# Patient Record
Sex: MALE | Race: WHITE | NOT HISPANIC OR LATINO | Employment: FULL TIME | URBAN - METROPOLITAN AREA
[De-identification: names, ages, dates, MRNs, and addresses within clinical notes are randomized per-mention and may not be internally consistent; named-entity substitution may affect disease eponyms.]

---

## 2017-05-23 ENCOUNTER — OFFICE VISIT (OUTPATIENT)
Dept: RETAIL CLINIC | Facility: CLINIC | Age: 42
End: 2017-05-23

## 2017-05-23 DIAGNOSIS — Z02.83 ENCOUNTER FOR DRUG SCREENING: Primary | ICD-10-CM

## 2017-07-24 ENCOUNTER — OFFICE VISIT (OUTPATIENT)
Dept: INTERNAL MEDICINE | Facility: CLINIC | Age: 42
End: 2017-07-24

## 2017-07-24 VITALS
HEIGHT: 71 IN | WEIGHT: 203.8 LBS | DIASTOLIC BLOOD PRESSURE: 74 MMHG | SYSTOLIC BLOOD PRESSURE: 122 MMHG | BODY MASS INDEX: 28.53 KG/M2 | TEMPERATURE: 97.8 F

## 2017-07-24 DIAGNOSIS — E78.00 PURE HYPERCHOLESTEROLEMIA: ICD-10-CM

## 2017-07-24 DIAGNOSIS — K21.9 GASTROESOPHAGEAL REFLUX DISEASE, ESOPHAGITIS PRESENCE NOT SPECIFIED: Primary | ICD-10-CM

## 2017-07-24 DIAGNOSIS — B37.2 SKIN YEAST INFECTION: ICD-10-CM

## 2017-07-24 PROCEDURE — 99214 OFFICE O/P EST MOD 30 MIN: CPT | Performed by: FAMILY MEDICINE

## 2017-07-24 RX ORDER — CLOTRIMAZOLE 1 %
CREAM (GRAM) TOPICAL 2 TIMES DAILY
Qty: 14 G | Refills: 0 | OUTPATIENT
Start: 2017-07-24 | End: 2019-08-04

## 2017-07-24 RX ORDER — OMEPRAZOLE 40 MG/1
CAPSULE, DELAYED RELEASE ORAL
Qty: 30 CAPSULE | Refills: 5 | OUTPATIENT
Start: 2017-07-24 | End: 2019-08-04

## 2017-07-24 NOTE — PROGRESS NOTES
Subjective   Devyn Shelton is a 41 y.o. male.     History of Present Illness   Here for 6mo routine. Last seen 1/7/16 for CPE and routine recheck GERD. Pt seen 3/2/15 as a new patient. Had normal CBC and + H pylori. Labs 1/7/16 demo normal CBC and CMP. Had vit D 21.9. Had borderline lipid with , Tg 173, HDL 35, .    1.GI- GERD. Pt is post treatment for H pylori. Did well but continued to have occasional reflux. Was also having some constipation with straining and hemorrhoids. Was started on miralax and given anusol. Was advised to keep the omeprazole prn (ie- 3x/wk) but to reduce it if able once his constipation improved. Today he reports he still has rectal itching. Has a BM q1-2 days. No straining. Is having more reflux. T^akes TUMS qd but still having reflux during the night. Doesn't have any omeprazole.      2.CV- borderline hyperlipid. Noted on screening labs. Pt advised diet with recheck lab today. Today he reports he has not been working on his and is not fasting today. 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    The following portions of the patient's history were reviewed and updated as appropriate: current medications, past family history, past medical history, past social history, past surgical history and problem list.    Review of Systems   Cardiovascular: Negative for chest pain.   Gastrointestinal: Negative for abdominal distention and abdominal pain.        Heartburn   Skin: Negative for color change.   Neurological: Negative for tremors, speech difficulty and headaches.   Psychiatric/Behavioral: Negative for agitation and confusion.   All other systems reviewed and are negative.        Current Outpatient Prescriptions:   •  clotrimazole (LOTRIMIN AF) 1 % cream, Apply  topically 2 (Two) Times a Day. Apply to itchy skin bid until improved, Disp: 14 g, Rfl: 0  •  omeprazole (priLOSEC) 40 MG capsule, for stomach acid or reflux, Disp: 30 capsule, Rfl: 5  •   "polyethylene glycol (MIRALAX) powder, Take  by mouth daily. Mix 1 capful in 8 ounces of liquid and drink daily to maintain soft, regular stools., Disp: , Rfl:     Objective     /74  Temp 97.8 °F (36.6 °C)  Ht 71\" (180.3 cm)  Wt 203 lb 12.8 oz (92.4 kg)  BMI 28.42 kg/m2    Physical Exam   Constitutional: He is oriented to person, place, and time. He appears well-developed and well-nourished.   HENT:   Right Ear: Tympanic membrane and ear canal normal.   Left Ear: Tympanic membrane and ear canal normal.   Mouth/Throat: Oropharynx is clear and moist.   Eyes: Conjunctivae and EOM are normal. Pupils are equal, round, and reactive to light.   Neck: No thyromegaly present.   Cardiovascular: Normal rate and regular rhythm.    Pulmonary/Chest: Effort normal and breath sounds normal.   Genitourinary: Rectum normal.   Neurological: He is alert and oriented to person, place, and time.   Skin: Skin is warm and dry.   Psychiatric: He has a normal mood and affect.   Vitals reviewed.      Assessment/Plan   Devyn was seen today for heartburn.    Diagnoses and all orders for this visit:    Gastroesophageal reflux disease, esophagitis presence not specified  -     omeprazole (priLOSEC) 40 MG capsule; for stomach acid or reflux    Pure hypercholesterolemia    Skin yeast infection  -     clotrimazole (LOTRIMIN AF) 1 % cream; Apply  topically 2 (Two) Times a Day. Apply to itchy skin bid until improved      1. GI- GERD- pt will restart the omeprazole every day now. Is to take it for a full month and then try going to \"as needed\". If still needing it 2x/wk or more, to restart it every day. Can try going to   \"as needed\" at the end of each month. Edu printout. Will monitor his calcium and B12.  2. DERM- yeast. Discussed that he is having minor yeast issues around his rectum. This can relate to sweating or to the steroid in the hemorrhoid cream. Will treat with lotrimin cream as needed.  2. CV- borderline hyperlipid- pt will " start to work on diet now and will fast for recheck in 6mo  3. RECHECK- CPE with fasting labs

## 2017-07-24 NOTE — PATIENT INSTRUCTIONS
"1. GI- GERD- pt will restart the omeprazole every day now. Is to take it for a full month and then try going to \"as needed\". If still needing it 2x/wk or more, to restart it every day. Can try going to   \"as needed\" at the end of each month. Edu printout. Will monitor his calcium and B12.  2. DERM- yeast. Discussed that he is having minor yeast issues around his rectum. This can relate to sweating or to the steroid in the hemorrhoid cream. Will treat with lotrimin cream as needed.  2. CV- borderline hyperlipid- pt will start to work on diet now and will fast for recheck in 6mo  3. RECHECK- CPE with fasting labs    Patient education provided today in layman's terms re GERD (acid reflux) and gastritis. The patient is shown a diagram of the gastrointestinal tract with a brief description of the digestive process. The role of stomach acid (to kill germs and digest food) and stomach mucus (to protect the stomach against its own acid) is discussed. The need for a balance between these and the problems that result when this balance is disrupted are discussed.     The patient is advised that things that promote too much stomach acid include:  - NSAIDs (anti inflammatories such as aspirin, ibuprofen, naproxen and certain prescriptions)  - high acid foods (tomato products, onions, citrus, etc)  - caffeine  - tobacco  - alcohol  - stress.   It is discussed that long term or high exposure to these can lead to an acid imbalance resulting in microscopic burns in the stomach or esophagus. Furthermore, that with repeated burns, a visible wound called an ulcer can form.    The treatment options are discussed, including avoiding the causes if possible. The first level of treatment can include antacids which neutralize the acid after the fact, such as TUMS, rolaids or gaviscon. The second level of treatment includes H2 blockers which reduce the production of acid in the stomach and include medicines such as pepcid (famotadine) and " zantac (ranitadine). The third level of treatment includes PPI's which turn the stomach acid off and include meducines such as prilosec (omeprazole), prevacid (lansoprazole) or protonix (pantoprazole). Caution is advised regarding the long term use of PPI's due to the possible side effect of malabsorption of key nutrients such as B12 or calcium if the acid level is driven too low for too long.

## 2019-08-06 ENCOUNTER — OFFICE VISIT (OUTPATIENT)
Dept: FAMILY MEDICINE CLINIC | Facility: CLINIC | Age: 44
End: 2019-08-06

## 2019-08-06 ENCOUNTER — APPOINTMENT (OUTPATIENT)
Dept: LAB | Facility: HOSPITAL | Age: 44
End: 2019-08-06

## 2019-08-06 VITALS
BODY MASS INDEX: 28.61 KG/M2 | HEART RATE: 78 BPM | HEIGHT: 71 IN | SYSTOLIC BLOOD PRESSURE: 142 MMHG | WEIGHT: 204.4 LBS | DIASTOLIC BLOOD PRESSURE: 90 MMHG | OXYGEN SATURATION: 98 %

## 2019-08-06 DIAGNOSIS — E06.9 THYROIDITIS: Primary | ICD-10-CM

## 2019-08-06 DIAGNOSIS — I88.9 LYMPHADENITIS: ICD-10-CM

## 2019-08-06 DIAGNOSIS — E78.00 PURE HYPERCHOLESTEROLEMIA: ICD-10-CM

## 2019-08-06 LAB
ALBUMIN SERPL-MCNC: 4.2 G/DL (ref 3.5–5.2)
ALBUMIN/GLOB SERPL: 1.7 G/DL
ALP SERPL-CCNC: 93 U/L (ref 39–117)
ALT SERPL W P-5'-P-CCNC: 29 U/L (ref 1–41)
ANION GAP SERPL CALCULATED.3IONS-SCNC: 11.5 MMOL/L (ref 5–15)
AST SERPL-CCNC: 22 U/L (ref 1–40)
BASOPHILS # BLD AUTO: 0.03 10*3/MM3 (ref 0–0.2)
BASOPHILS NFR BLD AUTO: 0.6 % (ref 0–1.5)
BILIRUB SERPL-MCNC: 0.6 MG/DL (ref 0.2–1.2)
BUN BLD-MCNC: 10 MG/DL (ref 6–20)
BUN/CREAT SERPL: 10.3 (ref 7–25)
CALCIUM SPEC-SCNC: 9.2 MG/DL (ref 8.6–10.5)
CHLORIDE SERPL-SCNC: 107 MMOL/L (ref 98–107)
CHOLEST SERPL-MCNC: 187 MG/DL (ref 0–200)
CO2 SERPL-SCNC: 26.5 MMOL/L (ref 22–29)
CREAT BLD-MCNC: 0.97 MG/DL (ref 0.76–1.27)
DEPRECATED RDW RBC AUTO: 39.3 FL (ref 37–54)
EOSINOPHIL # BLD AUTO: 0.3 10*3/MM3 (ref 0–0.4)
EOSINOPHIL NFR BLD AUTO: 5.5 % (ref 0.3–6.2)
ERYTHROCYTE [DISTWIDTH] IN BLOOD BY AUTOMATED COUNT: 12.2 % (ref 12.3–15.4)
GFR SERPL CREATININE-BSD FRML MDRD: 84 ML/MIN/1.73
GLOBULIN UR ELPH-MCNC: 2.5 GM/DL
GLUCOSE BLD-MCNC: 90 MG/DL (ref 65–99)
HCT VFR BLD AUTO: 48.1 % (ref 37.5–51)
HDLC SERPL-MCNC: 31 MG/DL (ref 40–60)
HGB BLD-MCNC: 16 G/DL (ref 13–17.7)
IMM GRANULOCYTES # BLD AUTO: 0.02 10*3/MM3 (ref 0–0.05)
IMM GRANULOCYTES NFR BLD AUTO: 0.4 % (ref 0–0.5)
LDLC SERPL CALC-MCNC: 126 MG/DL (ref 0–100)
LDLC/HDLC SERPL: 4.06 {RATIO}
LYMPHOCYTES # BLD AUTO: 1.67 10*3/MM3 (ref 0.7–3.1)
LYMPHOCYTES NFR BLD AUTO: 30.6 % (ref 19.6–45.3)
MCH RBC QN AUTO: 29.1 PG (ref 26.6–33)
MCHC RBC AUTO-ENTMCNC: 33.3 G/DL (ref 31.5–35.7)
MCV RBC AUTO: 87.6 FL (ref 79–97)
MONOCYTES # BLD AUTO: 0.44 10*3/MM3 (ref 0.1–0.9)
MONOCYTES NFR BLD AUTO: 8.1 % (ref 5–12)
NEUTROPHILS # BLD AUTO: 2.99 10*3/MM3 (ref 1.7–7)
NEUTROPHILS NFR BLD AUTO: 54.8 % (ref 42.7–76)
NRBC BLD AUTO-RTO: 0 /100 WBC (ref 0–0.2)
PLATELET # BLD AUTO: 207 10*3/MM3 (ref 140–450)
PMV BLD AUTO: 10.2 FL (ref 6–12)
POTASSIUM BLD-SCNC: 4.2 MMOL/L (ref 3.5–5.2)
PROT SERPL-MCNC: 6.7 G/DL (ref 6–8.5)
RBC # BLD AUTO: 5.49 10*6/MM3 (ref 4.14–5.8)
SODIUM BLD-SCNC: 145 MMOL/L (ref 136–145)
TRIGL SERPL-MCNC: 150 MG/DL (ref 0–150)
TSH SERPL DL<=0.05 MIU/L-ACNC: 2.03 MIU/ML (ref 0.27–4.2)
VLDLC SERPL-MCNC: 30 MG/DL (ref 5–40)
WBC NRBC COR # BLD: 5.45 10*3/MM3 (ref 3.4–10.8)

## 2019-08-06 PROCEDURE — 99213 OFFICE O/P EST LOW 20 MIN: CPT | Performed by: FAMILY MEDICINE

## 2019-08-06 PROCEDURE — 80053 COMPREHEN METABOLIC PANEL: CPT | Performed by: FAMILY MEDICINE

## 2019-08-06 PROCEDURE — 85025 COMPLETE CBC W/AUTO DIFF WBC: CPT | Performed by: FAMILY MEDICINE

## 2019-08-06 PROCEDURE — 80061 LIPID PANEL: CPT | Performed by: FAMILY MEDICINE

## 2019-08-06 PROCEDURE — 84443 ASSAY THYROID STIM HORMONE: CPT | Performed by: FAMILY MEDICINE

## 2019-08-06 RX ORDER — AMOXICILLIN AND CLAVULANATE POTASSIUM 875; 125 MG/1; MG/1
1 TABLET, FILM COATED ORAL 2 TIMES DAILY
Qty: 14 TABLET | Refills: 0 | Status: SHIPPED | OUTPATIENT
Start: 2019-08-06 | End: 2019-08-13

## 2019-08-06 NOTE — PROGRESS NOTES
Subjective   Devyn Shelton is a 43 y.o. male.     Chief Complaint   Patient presents with   • Establish Care     knot on right side of neck, sore to touch        History of Present Illness     Acute complaints:  Devyn Shelton is a 43 y.o. male who presents today to establish care.  He has a history of hyperlipidemia, states that he was seen in urgent care for throat pain, told he had a thyroid nodule.  He was instructed to follow-up.  He has had pain on the right side of his anterior neck for about the last week.  He did have a slight bump with ear pain he assumed was a viral infection a few weeks ago.  He has no history of thyroid issues.  He has no systemic symptoms today.    The following portions of the patient's history were reviewed and updated as appropriate: allergies, current medications, past family history, past medical history, past social history, past surgical history and problem list.    Active Ambulatory Problems     Diagnosis Date Noted   • Gastroesophageal reflux disease 07/24/2017   • Pure hypercholesterolemia 07/24/2017     Resolved Ambulatory Problems     Diagnosis Date Noted   • No Resolved Ambulatory Problems     Past Medical History:   Diagnosis Date   • GERD (gastroesophageal reflux disease)    • Hypertension      History reviewed. No pertinent surgical history.  Family History   Problem Relation Age of Onset   • Cancer Other    • Arthritis Other    • Hyperlipidemia Other    • Hypertension Other    • Stroke Other    • Heart attack Other    • Cancer Mother    • Hypertension Father    • Cancer Brother      Social History     Socioeconomic History   • Marital status:      Spouse name: Not on file   • Number of children: Not on file   • Years of education: Not on file   • Highest education level: Not on file   Tobacco Use   • Smoking status: Never Smoker   • Smokeless tobacco: Never Used   Substance and Sexual Activity   • Alcohol use: No     Frequency: Never   • Drug use: No  "        Review of Systems   Constitutional: Negative.    HENT: Positive for sore throat.    Respiratory: Negative.    Cardiovascular: Negative.    Gastrointestinal: Negative.    Endocrine: Negative.        Objective   Blood pressure 142/90, pulse 78, height 180 cm (70.87\"), weight 92.7 kg (204 lb 6.4 oz), SpO2 98 %.  Nursing note reviewed  Physical Exam  Const: NAD, A&Ox4, Pleasant, Cooperative  Eyes: EOMI, no conjunctivitis  ENT: No nasal discharge present, neck supple.  No palpable thyroid nodule today, however he does have palpable tender lymphadenopathy in the anterior cervical chain  Cardiac: Regular rate and rhythm, no cyanosis  Resp: Respiratory rate within normal limits, no increased work of breathing, no audible wheezing or retractions noted  GI: No distention or ascites  MSK: Motor and sensation grossly intact in bilateral upper extremities  Neurologic: CN II-XII grossly intact  Psych: Appropriate mood and behavior.  Skin: Pink, warm, dry  Procedures  Assessment/Plan   Devyn was seen today for establish care.    Diagnoses and all orders for this visit:    Thyroiditis  -     TSH Rfx On Abnormal To Free T4; Future  -     Ambulatory Referral to Endocrinology  -     CBC & Differential; Future  -     TSH Rfx On Abnormal To Free T4  -     CBC & Differential  -     CBC Auto Differential    Lymphadenitis  -     CBC & Differential; Future  -     amoxicillin-clavulanate (AUGMENTIN) 875-125 MG per tablet; Take 1 tablet by mouth 2 (Two) Times a Day for 7 days.  -     CBC & Differential  -     CBC Auto Differential    Pure hypercholesterolemia  -     Comprehensive Metabolic Panel; Future  -     Lipid Panel; Future  -     Comprehensive Metabolic Panel  -     Lipid Panel        We will plan to obtain previous records both for chronic preventative care as well as those related to the current episode of care.  Any records that the patient brought with him today were reviewed personally by me during the visit today and " will be scanned into the chart for posterity.    There are no Patient Instructions on file for this visit.    Return in about 2 weeks (around 8/20/2019).    Ambulatory progress note signed and attested to by Helder Swanson D.O.

## 2019-08-07 ENCOUNTER — APPOINTMENT (OUTPATIENT)
Dept: GENERAL RADIOLOGY | Facility: HOSPITAL | Age: 44
End: 2019-08-07

## 2019-08-07 ENCOUNTER — HOSPITAL ENCOUNTER (EMERGENCY)
Facility: HOSPITAL | Age: 44
Discharge: HOME | End: 2019-08-07
Attending: EMERGENCY MEDICINE | Admitting: EMERGENCY MEDICINE

## 2019-08-07 VITALS
TEMPERATURE: 97.7 F | HEART RATE: 76 BPM | SYSTOLIC BLOOD PRESSURE: 142 MMHG | BODY MASS INDEX: 29.4 KG/M2 | OXYGEN SATURATION: 95 % | WEIGHT: 210 LBS | HEIGHT: 71 IN | DIASTOLIC BLOOD PRESSURE: 110 MMHG | RESPIRATION RATE: 16 BRPM

## 2019-08-07 DIAGNOSIS — M43.6 TORTICOLLIS, ACUTE: Primary | ICD-10-CM

## 2019-08-07 PROCEDURE — 96372 THER/PROPH/DIAG INJ SC/IM: CPT

## 2019-08-07 PROCEDURE — 72040 X-RAY EXAM NECK SPINE 2-3 VW: CPT

## 2019-08-07 PROCEDURE — 25010000002 KETOROLAC TROMETHAMINE PER 15 MG: Performed by: PHYSICIAN ASSISTANT

## 2019-08-07 PROCEDURE — 99283 EMERGENCY DEPT VISIT LOW MDM: CPT

## 2019-08-07 RX ORDER — KETOROLAC TROMETHAMINE 30 MG/ML
60 INJECTION, SOLUTION INTRAMUSCULAR; INTRAVENOUS ONCE
Status: COMPLETED | OUTPATIENT
Start: 2019-08-07 | End: 2019-08-07

## 2019-08-07 RX ORDER — METHOCARBAMOL 750 MG/1
750 TABLET, FILM COATED ORAL 3 TIMES DAILY PRN
Qty: 12 TABLET | Refills: 0 | Status: SHIPPED | OUTPATIENT
Start: 2019-08-07

## 2019-08-07 RX ADMIN — KETOROLAC TROMETHAMINE 60 MG: 30 INJECTION, SOLUTION INTRAMUSCULAR; INTRAVENOUS at 18:37

## 2019-08-07 NOTE — ED PROVIDER NOTES
Subjective   Devyn Shelton is a 43 y.o. male presenting to the emergency department complaining of neck pain. He reports that he developed a knot on his anterior neck with pain 3 days ago, prompting him to visit an urgent treatment center. He was diagnosed with thyroid pain and referred to Dr. Swanson, family medicine. Dr. Swanson diagnosed him with thyroiditis and prescribed amoxicillin. He reports that he developed some intermittent sharp, shooting posterior neck pain and headache today while driving, prompting presentation here. He currently rates his pain as a 6/10 and notes that it is worse with side-to-side movement. He denies any chest pain or shortness of breath. He notes that he does frequent heavy lifting because of his work as a  but that he didn't do any significant heavy lifting today. He has a history of GERD and hypertension. There are no other acute complaints at this time.         History provided by:  Patient  Neck Pain   Pain location: posterior neck.  Quality:  Shooting  Pain radiates to:  Head  Pain severity:  Moderate  Pain is:  Same all the time  Onset quality:  Sudden  Duration: earlier today.  Timing:  Intermittent  Progression:  Unchanged  Chronicity:  New  Context comment:  Frequent heavy lifting, but not today  Relieved by:  None tried  Worsened by:  Nothing  Ineffective treatments:  None tried  Associated symptoms: headaches    Associated symptoms: no chest pain        Review of Systems   Respiratory: Negative for shortness of breath.    Cardiovascular: Negative for chest pain.   Musculoskeletal: Positive for neck pain.   Neurological: Positive for headaches.   All other systems reviewed and are negative.      Past Medical History:   Diagnosis Date   • GERD (gastroesophageal reflux disease)    • Hypertension        No Known Allergies    History reviewed. No pertinent surgical history.    Family History   Problem Relation Age of Onset   • Cancer Other    • Arthritis Other     • Hyperlipidemia Other    • Hypertension Other    • Stroke Other    • Heart attack Other    • Cancer Mother    • Hypertension Father    • Cancer Brother        Social History     Socioeconomic History   • Marital status:      Spouse name: Not on file   • Number of children: Not on file   • Years of education: Not on file   • Highest education level: Not on file   Tobacco Use   • Smoking status: Never Smoker   • Smokeless tobacco: Never Used   Substance and Sexual Activity   • Alcohol use: No     Frequency: Never   • Drug use: No         Objective   Physical Exam   Constitutional: He is oriented to person, place, and time. He appears well-developed and well-nourished. No distress.   Reports pain and crepitus with side-to-side motion of the neck, pointing to C5, C6, and C7. No rash or lesion. Neck has full range of motion. He reports that his pain is markedly better than it was.   HENT:   Head: Normocephalic and atraumatic.   Eyes: Conjunctivae are normal. No scleral icterus.   Neck: Normal range of motion. Neck supple.   Cardiovascular: Normal rate, regular rhythm and normal heart sounds.   No murmur heard.  Pulmonary/Chest: Effort normal and breath sounds normal. No respiratory distress.   Abdominal: Soft. There is no tenderness.   Musculoskeletal: Normal range of motion. He exhibits no tenderness.   No current tenderness to palpation of the cervical spine. Reports pain and crepitus with side-to-side motion, pointing to C5, C6, and C7. Neck has full range of motion. He reports that his pain is markedly better than it was.   Neurological: He is alert and oriented to person, place, and time.   Skin: Skin is warm and dry. No lesion and no rash noted.   No rash or lesion.   Psychiatric: He has a normal mood and affect. His behavior is normal.   Nursing note and vitals reviewed.      Procedures         ED Course     No results found for this or any previous visit (from the past 24 hour(s)).  Note: In addition to  "lab results from this visit, the labs listed above may include labs taken at another facility or during a different encounter within the last 24 hours. Please correlate lab times with ED admission and discharge times for further clarification of the services performed during this visit.    XR Spine Cervical 2 or 3 View   Final Result   There are no acute findings. There are advanced degenerative   changes at C4-C5 and C5-C6 as manifest by large anterior osteophytes.       DICTATED:   08/07/2019   EDITED/ls :   08/07/2019        This report was finalized on 8/8/2019 8:32 AM by Dr. Dread Lindo MD.            Vitals:    08/07/19 1715 08/07/19 1800 08/07/19 1931   BP: (!) 190/105 137/100 (!) 142/110   BP Location: Left arm     Patient Position: Sitting     Pulse: 73  76   Resp: 18  16   Temp: 97.7 °F (36.5 °C)     TempSrc: Oral     SpO2: 98% 94% 95%   Weight: 95.3 kg (210 lb)     Height: 180.3 cm (71\")       Medications   ketorolac (TORADOL) injection 60 mg (60 mg Intramuscular Given 8/7/19 1837)     ECG/EMG Results (last 24 hours)     ** No results found for the last 24 hours. **        No orders to display                       MDM  Number of Diagnoses or Management Options  Torticollis, acute: new and requires workup     Amount and/or Complexity of Data Reviewed  Tests in the radiology section of CPT®: reviewed and ordered  Discuss the patient with other providers: yes    Patient Progress  Patient progress: stable      Final diagnoses:   Torticollis, acute       Documentation assistance provided by juanjose Bryant.  Information recorded by the juanjose was done at my direction and has been verified and validated by me.     Magda Bryant  08/07/19 [id number]       Anjel Azevedo PA  08/08/19 0981    "

## 2019-10-09 ENCOUNTER — TRANSCRIBE ORDERS (OUTPATIENT)
Dept: ADMINISTRATIVE | Facility: HOSPITAL | Age: 44
End: 2019-10-09

## 2019-10-09 DIAGNOSIS — R10.31 RLQ ABDOMINAL PAIN: Primary | ICD-10-CM

## 2023-03-14 ENCOUNTER — TRANSCRIBE ORDERS (OUTPATIENT)
Dept: ADMINISTRATIVE | Facility: HOSPITAL | Age: 48
End: 2023-03-14
Payer: COMMERCIAL

## 2023-03-14 DIAGNOSIS — R19.8 ALTERNATING CONSTIPATION AND DIARRHEA: Primary | ICD-10-CM

## 2025-07-15 ENCOUNTER — PATIENT ROUNDING (BHMG ONLY) (OUTPATIENT)
Dept: URGENT CARE | Facility: CLINIC | Age: 50
End: 2025-07-15
Payer: COMMERCIAL